# Patient Record
Sex: FEMALE | Race: WHITE | NOT HISPANIC OR LATINO | URBAN - METROPOLITAN AREA
[De-identification: names, ages, dates, MRNs, and addresses within clinical notes are randomized per-mention and may not be internally consistent; named-entity substitution may affect disease eponyms.]

---

## 2020-05-14 ENCOUNTER — APPOINTMENT (OUTPATIENT)
Dept: WOMENS IMAGING | Facility: HOSPITAL | Age: 30
End: 2020-05-14

## 2020-05-14 PROCEDURE — 76641 ULTRASOUND BREAST COMPLETE: CPT | Performed by: RADIOLOGY

## 2020-05-14 PROCEDURE — G0279 TOMOSYNTHESIS, MAMMO: HCPCS | Performed by: RADIOLOGY

## 2020-05-14 PROCEDURE — 77066 DX MAMMO INCL CAD BI: CPT | Performed by: RADIOLOGY

## 2020-05-14 PROCEDURE — 77062 BREAST TOMOSYNTHESIS BI: CPT | Performed by: RADIOLOGY

## 2024-10-10 ENCOUNTER — APPOINTMENT (OUTPATIENT)
Dept: WOMENS IMAGING | Facility: HOSPITAL | Age: 34
End: 2024-10-10
Payer: COMMERCIAL

## 2024-10-10 PROCEDURE — MDREVIEWSP: Performed by: RADIOLOGY

## 2024-10-10 PROCEDURE — 76642 ULTRASOUND BREAST LIMITED: CPT | Performed by: RADIOLOGY

## 2024-10-10 PROCEDURE — G0279 TOMOSYNTHESIS, MAMMO: HCPCS | Performed by: RADIOLOGY

## 2024-10-10 PROCEDURE — 77066 DX MAMMO INCL CAD BI: CPT | Performed by: RADIOLOGY

## 2024-10-10 PROCEDURE — 77062 BREAST TOMOSYNTHESIS BI: CPT | Performed by: RADIOLOGY

## 2024-10-21 ENCOUNTER — TELEPHONE (OUTPATIENT)
Dept: SURGERY | Facility: CLINIC | Age: 34
End: 2024-10-21
Payer: COMMERCIAL

## 2024-10-25 ENCOUNTER — TELEPHONE (OUTPATIENT)
Dept: SURGERY | Facility: CLINIC | Age: 34
End: 2024-10-25
Payer: COMMERCIAL

## 2024-12-02 ENCOUNTER — TELEPHONE (OUTPATIENT)
Dept: SURGERY | Facility: CLINIC | Age: 34
End: 2024-12-02
Payer: COMMERCIAL

## 2024-12-02 NOTE — TELEPHONE ENCOUNTER
Spoke to pt and got her sofia for a new pt appt with sanam orellana for poss high risk on 02/13    Pt stated understanding   Mailed new pt packet

## 2025-02-11 NOTE — PROGRESS NOTES
[facility]     Referring Provider: Heidi Mansfield MD     Chief complaint: management of breast cancer risk     HPI: Ms. Lizzie Bazan is a 36 yo woman, seen at the request of Heidi Mansfield MD, for management of breast cancer risk    I personally reviewed her records and summarized her relevant breast history/imagin2020 bilateral diagnostic mammogram right complete breast ultrasound at [facility]  There are scattered areas of fibroglandular density.  Finding 1:  The symptomatic area is clearly marked.  There is no suspicious finding in region of clinical concern.  Next, ultrasound was performed.  Finding 2:  There are several skin, round and punctate calcifications with diffuse distribution seen in both  breasts.  This is a typically benign pattern/process.  There are asymmetries and areas of architectural distortion  consistent with prior reduction mammoplasties.  No suspicious masses, suspicious microcalcifications or new areas  of architectural distortion are identified.  RIGHT BREAST REALTIME COMPLETE BREAST ULTRASOUND  High resolution real-time ultrasound scanning was performed by the ultrasound technologist. Still images were  obtained by the ultrasound technologist and submitted for radiologist review.  Finding 1:  There is no sonographic correlate.  No discrete or suspicious sonographic abnormality is seen.  All four quadrants of the breast(s), axilla, and the retroareolar region were imaged.  IMPRESSION:  Finding 1:  Area in the right breast is benign-negative. In view of the negative findings, the patient will need to  be followed clinically. Should a lump persist, surgical consultation would be recommended.  Finding 2:  Calcifications in both breasts are benign-negative. The patient was mailed a notification letter.  Screening mammogram at age 40 is recommended.  BI-RADS Category 2: Benign    10/10/2024 bilateral diagnostic mammogram and right limited breast ultrasound at [facility]  Finding 1:  There are  stable post-surgical scars seen in both breasts. Findings are in an area of prior bilateral  breast reduction.  Finding 2:  There are round and rim calcifications with diffuse distribution seen in both breasts.  There are no  new suspicious masses, calcifications, or areas of architectural distortion.  Finding 3:  There is no worrisome correlate for the right clear to milky discharge that has occured over 3 months,  or for the bilateral nipple pruritis. She denies any bloody nipple discharge, or skin changes of either nipple.  RIGHT BREAST REALTIME LIMITED BREAST ULTRASOUND  High resolution real-time ultrasound scanning was performed by the ultrasound technologist. Still images were  obtained by the ultrasound technologist and submitted for radiologist review.  Finding 3:  There are no suspicious masses, areas of focal shadowing or distortion seen.  IMPRESSION:  Finding 1:  Stable post-surgical scars in both breasts are benign.  Finding 2:  Calcifications in both breasts are benign.   Finding 3:  There is no mammographic or sonographic correlate to explain what is likely physiologic right nipple  discharge or the bilateral nipple pruritis. Clinical correlation is recommended for extramammary etiologies for  discharge and to exclude pathologic discharge. Any further management of this symptom should be based on findings  at clinical assessment. That is, negative imaging should not preclude further evaluation and/or biopsy of  suspicious clinical findings.  For any pathologic nipple discharge (single orifice, spontaneous, clear or bloody), further evaluation with breast  MRI is recommended.  RECOMMENDATIONS: Screening mammogram at age 40 is recommended.  Given the patient's strong family history of breast  cancer, recommendation is for official risk assessment with referral to high risk clinic.  Established patient consult was performed. The results were discussed with the patient by the physician and a  notification  letter was mailed.  The patient was mailed a notification letter.  BI-RADS Category 2: Benign      She has a personal history of 2006 breast reduction        She has a family history of breast cancer in her mother dx age 50s (negative genetics).  She denies any family history of  ovarian cancer.     Today she presents with bilateral white discharge that has been going on for 1 year.  She does express it at times.  It is only white.  Family history of breast cancer in her mother and wondering if she is high risk.  Mom only had BRCA testing 15 years ago.  She is also been experiencing bilateral diffuse breast pain.  She did recently change her bra she has not noticed a difference.  She has not tried any vitamin E.  She denies any breast lumps, skin changes  She denies any prior history of abnormal mammograms or breast biopsies.       Review of Systems - Oncology    Medications:  No current outpatient medications on file.    Allergies:  Not on File    Medical history:  No past medical history on file.    Surgical History:  No past surgical history on file.    Family History:  No family history on file.    Social History:   Social History     Socioeconomic History    Marital status: Unknown     Patient drinks 2 servings of caffeine per day.       GYNECOLOGIC HISTORY:   G: 3. P: 3. AB: 0.  Last menstrual period: 2025  Age at menarche: 12  Age at first childbirth: 27  Lactation/How lon months   Age at menopause: N/A  Total years of oral contraceptive use: N/A  Total years of hormone replacement therapy: N/A      Physical Exam  There were no vitals filed for this visit.  ECOG 0 - Asymptomatic  General: NAD, well appearing  Psych: a&o x 3, normal mood and affect  Eyes: EOMI, no scleral icterus  ENMT: neck supple without masses or thyromegaly, mucus membranes moist  Resp: normal effort, CTAB  CV: RRR, no murmurs, no edema   GI: soft, NT, ND  MSK: normal gait, normal ROM in bilateral shoulders  Lymph nodes: no  cervical, supraclavicular or axillary lymphadenopathy  Breast: symmetric, large,   Right: No visible abnormalities on inspection while seated, with arms raised or hands on hips. No masses, skin changes, or nipple abnormalities. Reduction scar  Left: No visible abnormalities on inspection while seated, with arms raised or hands on hips. No masses, skin changes, or nipple abnormalities. Reduction scar      Assessment:    Nipple discharge  Family history of breast cancer  High risk for breast cancer-according to TC8 is a lifetime risk of 22.9%  Breast pain    Discussion:  We discussed management options for individuals who are at increased risk (>20% lifetime risk):  1) High risk screening - Annual mammogram and annual breast MRI, alternating one test every 6 months, biannual clinical exam and monthly self breast exam. She meets criteria for high risk screening.  2) Chemoprevention with Tamoxifen, Raloxifene or Exemestane. These may reduce risk up to 50%. I reviewed that these particular medications are not without risks and the risk/benefit ratio must be considered carefully. She is not interested in this currently.  3) Risk reducing surgery such as prophylactic mastectomy  which may reduce risk by 90-95%. We discussed that this is a relatively radical strategy and is generally reserved for individuals with a known genetic mutation predisposing them to an increased risk (~50% risk) of breast cancer. She does not meet criteria for risk reducing surgery.   4) I discussed the importance of exercise and weight management as part of a risk reducing strategy, since increased BMI is associated with an increased risk of breast cancer. This is especially true in her case, as I expect her risk would decrease as she lost weight.  2. We discussed types of nipple discharge. We discussed that physiologic discharge is usually bilateral, nonspontaneous, multi-ductal, and milky, but can be yellow, green or brown. That this can  sometimes be associated with elevated prolactin levels and that elevated prolactin levels can have multiple causes (pregnancy, pituitary, thyroid, medications, etc).    We discussed that pathologic nipple discharge usually is unilateral, spontaneous, persistent, comes from a single duct, is bloody, clear, or serosanguinous, or is associated with a palpable or radiological evident breast mass.   3. In reviewing the patient's personal and family history of cancer, including the number, types and age of diagnosis, we found that the patient is eligible for genetic testing based on the National comprehensive cancer network (NCCN) guidelines.  The patient is aware that testing may reveal a pathogenic variant in any 1 of several genes, and that a pathogenic variant can markedly increase the risk of variety of cancers.  The types of cancers in the level of risk is dependent on which gene is affected.  The patient is also aware that we may find no pathogenic variants or that we could find what is called a variant of uncertain significance(VUS), which at this point in time has no direct impact on medical care.  In either these cases, risk would be determined by the family history alone, which would determine further management.  The patient knows that if we find a pathogenic variant, that her family members are also at risk and they should be informed of this information.    The purpose of the test is to identify whether you carry a gene variant (mutation) that might increase your risk of developing cancer or other diseases.    The reliability of positive or negative test results and the level of certainty that a positive test result for that disease or condition serves as a predictor of such disease: This test is not perfect.  If you carry a gene variant (mutation) and a cancer causing gene and increases your risk of cancer but does not mean you develop cancer.  Also, if you do not carry a gene variant (mutation), it does  not mean you will not get cancer.  This test will help us better manage you in order to help find cancer earlier or prevent it.  If you develop or have cancer, it may help us better treat that disease.    If you do carry a pathogenic gene variant (mutation), you will be given a management strategy to help minimize your risk and we will discuss what it means for you.  You are being tested for multiple genes, and each has its own set of cancers that it predisposes to in each cancer gene combination shows a different level of risk.  If you carry a gene variant(mutation) we have discussed the specific risk of cancer for that gene and arrange management that minimizes your future risk.    You may also be found to have a change in imaging that we do not understand (variant of uncertain significance (VUS)).  We will let you know and then manage this is a negative result.  Most of these turn out to be benign as we learn more.     For those patients who do not have cancer, a positive test can compromise the ability to obtain health and disability insurance, so your coverage should be satisfactory to you prior to testing.    Cost: Your insurance will likely cover the cost of genetic testing.  If you are insured denies payment, the testing lab will contact you to see if you are willing to pay out-of-pocket.  If so, the maximum cost is around $300.  If you are not willing to pay in your insurance or denies payment, the test will be canceled.    4. We had a lengthy discussion about breast pain and how it can sometimes be difficult to treat. We discussed that this is often related to hormonal changes. Caffeine and nicotine can also play a role in breast pain. We also discussed the importance of wearing a good supportive bra at all times including bedtime.  We discussed additional therapies such as vitamin E supplementation and Primrose oil, and that these may or may not be useful. We discussed using OTC tylenol or ibuprofen for  pain control. Finally, we discussed the use of topical NSAID creams.        Plan:  Tsh and prolactin level, call with results  Waiting to do genetics, she will see if her mom will update hers.  Rto in 3 mons  Stop expressing  Forlest bra, sleep in a sports bra  Vit E 400ius daily        BHARAT Rangel    I have spent 45 mins in face to face time with the patient and in chart review.    CC:  No Known Provider  Heidi Mansfield MD    EMR Dragon/transcription disclaimer:  Dictated using Dragon dictation

## 2025-02-13 ENCOUNTER — TELEPHONE (OUTPATIENT)
Dept: SURGERY | Facility: CLINIC | Age: 35
End: 2025-02-13
Payer: COMMERCIAL

## 2025-02-13 ENCOUNTER — LAB (OUTPATIENT)
Dept: LAB | Facility: HOSPITAL | Age: 35
End: 2025-02-13
Payer: COMMERCIAL

## 2025-02-13 ENCOUNTER — OFFICE VISIT (OUTPATIENT)
Dept: SURGERY | Facility: CLINIC | Age: 35
End: 2025-02-13
Payer: COMMERCIAL

## 2025-02-13 VITALS
DIASTOLIC BLOOD PRESSURE: 78 MMHG | HEART RATE: 76 BPM | BODY MASS INDEX: 27.99 KG/M2 | HEIGHT: 65 IN | WEIGHT: 168 LBS | SYSTOLIC BLOOD PRESSURE: 118 MMHG | OXYGEN SATURATION: 100 %

## 2025-02-13 DIAGNOSIS — Z91.89 AT HIGH RISK FOR BREAST CANCER: ICD-10-CM

## 2025-02-13 DIAGNOSIS — N64.4 BREAST PAIN: ICD-10-CM

## 2025-02-13 DIAGNOSIS — N64.52 NIPPLE DISCHARGE: ICD-10-CM

## 2025-02-13 DIAGNOSIS — N64.52 NIPPLE DISCHARGE: Primary | ICD-10-CM

## 2025-02-13 LAB
PROLACTIN SERPL-MCNC: 4.41 NG/ML (ref 4.79–23.3)
TSH SERPL DL<=0.05 MIU/L-ACNC: 1.99 UIU/ML (ref 0.27–4.2)

## 2025-02-13 PROCEDURE — 36415 COLL VENOUS BLD VENIPUNCTURE: CPT

## 2025-02-13 PROCEDURE — 84146 ASSAY OF PROLACTIN: CPT

## 2025-02-13 PROCEDURE — 84443 ASSAY THYROID STIM HORMONE: CPT

## 2025-02-13 RX ORDER — ERGOCALCIFEROL (VITAMIN D2) 10 MCG
400 TABLET ORAL DAILY
COMMUNITY

## 2025-02-13 NOTE — TELEPHONE ENCOUNTER
Spoke to pt and let her know that her prolactin levels are fine and we will see her at her set appt     Pt stated understanding

## 2025-02-13 NOTE — TELEPHONE ENCOUNTER
----- Message from Robert De Leon sent at 2/13/2025 10:28 AM EST -----  Let her know that her labs are fine  ----- Message -----  From: Lab, Background User  Sent: 2/13/2025  10:24 AM EST  To: BHARAT Rangel

## 2025-04-14 ENCOUNTER — TELEPHONE (OUTPATIENT)
Dept: SURGERY | Facility: CLINIC | Age: 35
End: 2025-04-14
Payer: COMMERCIAL

## 2025-04-14 NOTE — TELEPHONE ENCOUNTER
Pt called wanting to be switched to Dr Hightower for breast pain because she was originally supposed to see her about 6 months ago I told her we dont do things like that anymore and I also offered her a sooner appt to see sanam for the breast homero I told ehr she will only see Dr Hightower for surgery   Pt stated understanding and did not want a sooner appt with sanam

## 2025-06-24 ENCOUNTER — TELEPHONE (OUTPATIENT)
Dept: SURGERY | Facility: CLINIC | Age: 35
End: 2025-06-24
Payer: COMMERCIAL

## 2025-06-24 NOTE — TELEPHONE ENCOUNTER
I lvm w pt to let her know that she was supposed to fu with nayely yesterday and she cx her appt and no MRI was recommended at the last appt   I told her if she wants to call back to ch a fu with nayely that would be fine

## 2025-06-24 NOTE — TELEPHONE ENCOUNTER
Caller: Lizzie Bazan    Relationship: Self    Best call back number: [phone]    What orders are you requesting (i.e. lab or imaging): MRI    In what timeframe would the patient need to come in: ASAP    Additional notes: PATIENT SEEN 02/13/25 WHEN MRI WAS RECOMMENDED